# Patient Record
Sex: MALE | URBAN - METROPOLITAN AREA
[De-identification: names, ages, dates, MRNs, and addresses within clinical notes are randomized per-mention and may not be internally consistent; named-entity substitution may affect disease eponyms.]

---

## 2021-01-18 ENCOUNTER — APPOINTMENT (RX ONLY)
Dept: URBAN - METROPOLITAN AREA CLINIC 108 | Facility: CLINIC | Age: 31
Setting detail: DERMATOLOGY
End: 2021-01-18

## 2021-01-18 DIAGNOSIS — L738 OTHER SPECIFIED DISEASES OF HAIR AND HAIR FOLLICLES: ICD-10-CM

## 2021-01-18 DIAGNOSIS — L73.9 FOLLICULAR DISORDER, UNSPECIFIED: ICD-10-CM

## 2021-01-18 DIAGNOSIS — L663 OTHER SPECIFIED DISEASES OF HAIR AND HAIR FOLLICLES: ICD-10-CM

## 2021-01-18 PROBLEM — L02.12 FURUNCLE OF NECK: Status: ACTIVE | Noted: 2021-01-18

## 2021-01-18 PROBLEM — L02.821 FURUNCLE OF HEAD [ANY PART, EXCEPT FACE]: Status: ACTIVE | Noted: 2021-01-18

## 2021-01-18 PROCEDURE — ? PRESCRIPTION

## 2021-01-18 PROCEDURE — 99203 OFFICE O/P NEW LOW 30 MIN: CPT

## 2021-01-18 PROCEDURE — ? COUNSELING

## 2021-01-18 RX ORDER — CLINDAMYCIN PHOSPHATE 10 MG/ML
SOLUTION TOPICAL
Qty: 1 | Refills: 3 | Status: ERX | COMMUNITY
Start: 2021-01-18

## 2021-01-18 RX ORDER — DOXYCYCLINE 100 MG/1
CAPSULE ORAL
Qty: 60 | Refills: 3 | Status: ERX | COMMUNITY
Start: 2021-01-18

## 2021-01-18 RX ADMIN — DOXYCYCLINE: 100 CAPSULE ORAL at 00:00

## 2021-01-18 RX ADMIN — CLINDAMYCIN PHOSPHATE: 10 SOLUTION TOPICAL at 00:00

## 2021-01-18 ASSESSMENT — LOCATION DETAILED DESCRIPTION DERM
LOCATION DETAILED: POSTERIOR MID-PARIETAL SCALP
LOCATION DETAILED: MID POSTERIOR NECK

## 2021-01-18 ASSESSMENT — LOCATION SIMPLE DESCRIPTION DERM
LOCATION SIMPLE: POSTERIOR NECK
LOCATION SIMPLE: POSTERIOR SCALP

## 2021-01-18 ASSESSMENT — LOCATION ZONE DERM
LOCATION ZONE: NECK
LOCATION ZONE: SCALP

## 2021-01-18 NOTE — PROCEDURE: MIPS QUALITY
Detail Level: Detailed
Quality 226: Preventive Care And Screening: Tobacco Use: Screening And Cessation Intervention: Patient screened for tobacco use, is a smoker AND did not received Cessation Counseling for Medical Reasons
Quality 110: Preventive Care And Screening: Influenza Immunization: Influenza Immunization Ordered or Recommended, but not Administered due to system reason

## 2021-01-18 NOTE — HPI: INFECTION (FOLLICULITIS)
How Severe Is It?: moderate
Is This A New Presentation, Or A Follow-Up?: Folliculitis
Additional History: Patient states if he shaves his hair it helps keep it under control.

## 2021-03-01 ENCOUNTER — APPOINTMENT (RX ONLY)
Dept: URBAN - METROPOLITAN AREA CLINIC 108 | Facility: CLINIC | Age: 31
Setting detail: DERMATOLOGY
End: 2021-03-01

## 2021-03-01 DIAGNOSIS — L663 OTHER SPECIFIED DISEASES OF HAIR AND HAIR FOLLICLES: ICD-10-CM | Status: IMPROVED

## 2021-03-01 DIAGNOSIS — L73.9 FOLLICULAR DISORDER, UNSPECIFIED: ICD-10-CM | Status: IMPROVED

## 2021-03-01 DIAGNOSIS — L738 OTHER SPECIFIED DISEASES OF HAIR AND HAIR FOLLICLES: ICD-10-CM | Status: IMPROVED

## 2021-03-01 PROBLEM — L02.821 FURUNCLE OF HEAD [ANY PART, EXCEPT FACE]: Status: ACTIVE | Noted: 2021-03-01

## 2021-03-01 PROBLEM — L02.12 FURUNCLE OF NECK: Status: ACTIVE | Noted: 2021-03-01

## 2021-03-01 PROCEDURE — 99213 OFFICE O/P EST LOW 20 MIN: CPT

## 2021-03-01 PROCEDURE — ? COUNSELING

## 2021-03-01 PROCEDURE — ? TREATMENT REGIMEN

## 2021-03-01 ASSESSMENT — LOCATION SIMPLE DESCRIPTION DERM
LOCATION SIMPLE: POSTERIOR SCALP
LOCATION SIMPLE: POSTERIOR NECK

## 2021-03-01 ASSESSMENT — LOCATION ZONE DERM
LOCATION ZONE: SCALP
LOCATION ZONE: NECK

## 2021-03-01 ASSESSMENT — LOCATION DETAILED DESCRIPTION DERM
LOCATION DETAILED: LEFT POSTERIOR NECK
LOCATION DETAILED: POSTERIOR MID-PARIETAL SCALP

## 2021-03-01 NOTE — PROCEDURE: MIPS QUALITY
Detail Level: Detailed
Quality 110: Preventive Care And Screening: Influenza Immunization: Influenza Immunization Ordered or Recommended, but not Administered due to system reason
Quality 226: Preventive Care And Screening: Tobacco Use: Screening And Cessation Intervention: Patient screened for tobacco use, is a smoker AND did not received Cessation Counseling for Medical Reasons

## 2021-05-05 ENCOUNTER — APPOINTMENT (RX ONLY)
Dept: URBAN - METROPOLITAN AREA CLINIC 69 | Facility: CLINIC | Age: 31
Setting detail: DERMATOLOGY
End: 2021-05-05

## 2021-05-05 DIAGNOSIS — L738 OTHER SPECIFIED DISEASES OF HAIR AND HAIR FOLLICLES: ICD-10-CM

## 2021-05-05 DIAGNOSIS — L663 OTHER SPECIFIED DISEASES OF HAIR AND HAIR FOLLICLES: ICD-10-CM

## 2021-05-05 DIAGNOSIS — L73.9 FOLLICULAR DISORDER, UNSPECIFIED: ICD-10-CM

## 2021-05-05 PROBLEM — L02.821 FURUNCLE OF HEAD [ANY PART, EXCEPT FACE]: Status: ACTIVE | Noted: 2021-05-05

## 2021-05-05 PROCEDURE — ? ORDER TESTS

## 2021-05-05 PROCEDURE — ? COUNSELING

## 2021-05-05 PROCEDURE — 99213 OFFICE O/P EST LOW 20 MIN: CPT

## 2021-05-05 PROCEDURE — ? ADDITIONAL NOTES

## 2021-05-05 PROCEDURE — ? PRESCRIPTION MEDICATION MANAGEMENT

## 2021-05-05 PROCEDURE — ? TREATMENT REGIMEN

## 2021-05-05 ASSESSMENT — LOCATION DETAILED DESCRIPTION DERM: LOCATION DETAILED: POSTERIOR MID-PARIETAL SCALP

## 2021-05-05 ASSESSMENT — LOCATION ZONE DERM: LOCATION ZONE: SCALP

## 2021-05-05 ASSESSMENT — LOCATION SIMPLE DESCRIPTION DERM: LOCATION SIMPLE: POSTERIOR SCALP

## 2021-05-05 NOTE — PROCEDURE: PRESCRIPTION MEDICATION MANAGEMENT
Detail Level: Zone
Continue Regimen: - Doxycycline 100mg, 1 PO BID\\n- Clindamycin solution, apply BID
Render In Strict Bullet Format?: No

## 2021-05-05 NOTE — PROCEDURE: ORDER TESTS
Expected Date Of Service: 05/05/2021
Billing Type: Third-Party Bill
Performing Laboratory: -[id number]
Bill For Surgical Tray: no
Lab Facility: [id number]

## 2021-05-05 NOTE — PROCEDURE: ADDITIONAL NOTES
Additional Notes: Culture to look for gram negative folliculitis.
Render Risk Assessment In Note?: no
Detail Level: Zone

## 2021-05-13 ENCOUNTER — RX ONLY (OUTPATIENT)
Age: 31
Setting detail: RX ONLY
End: 2021-05-13

## 2021-05-13 RX ORDER — CEPHALEXIN 500 MG/1
CAPSULE ORAL
Qty: 30 | Refills: 0 | Status: ERX | COMMUNITY
Start: 2021-05-13

## 2023-11-15 ENCOUNTER — OFFICE VISIT (OUTPATIENT)
Dept: MEDICAL GROUP | Facility: PHYSICIAN GROUP | Age: 33
End: 2023-11-15
Payer: COMMERCIAL

## 2023-11-15 VITALS
HEART RATE: 92 BPM | DIASTOLIC BLOOD PRESSURE: 78 MMHG | TEMPERATURE: 98.6 F | SYSTOLIC BLOOD PRESSURE: 122 MMHG | OXYGEN SATURATION: 97 % | WEIGHT: 295 LBS | BODY MASS INDEX: 39.1 KG/M2 | HEIGHT: 73 IN

## 2023-11-15 DIAGNOSIS — R09.81 CHRONIC NASAL CONGESTION: ICD-10-CM

## 2023-11-15 DIAGNOSIS — R68.82 DECREASED SEX DRIVE: ICD-10-CM

## 2023-11-15 DIAGNOSIS — Z23 NEED FOR VACCINATION: ICD-10-CM

## 2023-11-15 DIAGNOSIS — E66.9 CLASS 2 OBESITY WITHOUT SERIOUS COMORBIDITY WITH BODY MASS INDEX (BMI) OF 38.0 TO 38.9 IN ADULT, UNSPECIFIED OBESITY TYPE: ICD-10-CM

## 2023-11-15 DIAGNOSIS — Z76.89 ENCOUNTER TO ESTABLISH CARE: ICD-10-CM

## 2023-11-15 DIAGNOSIS — M54.12 CERVICAL RADICULOPATHY: ICD-10-CM

## 2023-11-15 DIAGNOSIS — F90.9 ATTENTION DEFICIT HYPERACTIVITY DISORDER (ADHD), UNSPECIFIED ADHD TYPE: ICD-10-CM

## 2023-11-15 DIAGNOSIS — L73.9 FOLLICULITIS: ICD-10-CM

## 2023-11-15 DIAGNOSIS — F41.1 GAD (GENERALIZED ANXIETY DISORDER): ICD-10-CM

## 2023-11-15 PROCEDURE — 3078F DIAST BP <80 MM HG: CPT | Performed by: STUDENT IN AN ORGANIZED HEALTH CARE EDUCATION/TRAINING PROGRAM

## 2023-11-15 PROCEDURE — 99204 OFFICE O/P NEW MOD 45 MIN: CPT | Mod: 25 | Performed by: STUDENT IN AN ORGANIZED HEALTH CARE EDUCATION/TRAINING PROGRAM

## 2023-11-15 PROCEDURE — 90746 HEPB VACCINE 3 DOSE ADULT IM: CPT | Performed by: STUDENT IN AN ORGANIZED HEALTH CARE EDUCATION/TRAINING PROGRAM

## 2023-11-15 PROCEDURE — 90715 TDAP VACCINE 7 YRS/> IM: CPT | Performed by: STUDENT IN AN ORGANIZED HEALTH CARE EDUCATION/TRAINING PROGRAM

## 2023-11-15 PROCEDURE — 3074F SYST BP LT 130 MM HG: CPT | Performed by: STUDENT IN AN ORGANIZED HEALTH CARE EDUCATION/TRAINING PROGRAM

## 2023-11-15 PROCEDURE — 90472 IMMUNIZATION ADMIN EACH ADD: CPT | Performed by: STUDENT IN AN ORGANIZED HEALTH CARE EDUCATION/TRAINING PROGRAM

## 2023-11-15 PROCEDURE — 90471 IMMUNIZATION ADMIN: CPT | Performed by: STUDENT IN AN ORGANIZED HEALTH CARE EDUCATION/TRAINING PROGRAM

## 2023-11-15 RX ORDER — CLINDAMYCIN PHOSPHATE 11.9 MG/ML
SOLUTION TOPICAL
COMMUNITY
Start: 2023-10-25

## 2023-11-15 RX ORDER — KETOCONAZOLE 20 MG/ML
SHAMPOO TOPICAL
COMMUNITY
Start: 2023-10-25

## 2023-11-15 RX ORDER — BUSPIRONE HYDROCHLORIDE 10 MG/1
10 TABLET ORAL 2 TIMES DAILY
Qty: 60 TABLET | Refills: 1 | Status: SHIPPED | OUTPATIENT
Start: 2023-11-15 | End: 2023-12-07

## 2023-11-15 RX ORDER — CLINDAMYCIN PHOSPHATE AND BENZOYL PEROXIDE 10; 50 MG/G; MG/G
GEL TOPICAL
COMMUNITY
Start: 2023-10-25 | End: 2023-11-15

## 2023-11-15 RX ORDER — MINOCYCLINE HYDROCHLORIDE 100 MG/1
CAPSULE ORAL
COMMUNITY
Start: 2023-10-25

## 2023-11-15 ASSESSMENT — ANXIETY QUESTIONNAIRES
3. WORRYING TOO MUCH ABOUT DIFFERENT THINGS: NOT AT ALL
2. NOT BEING ABLE TO STOP OR CONTROL WORRYING: SEVERAL DAYS
GAD7 TOTAL SCORE: 7
1. FEELING NERVOUS, ANXIOUS, OR ON EDGE: SEVERAL DAYS
6. BECOMING EASILY ANNOYED OR IRRITABLE: NEARLY EVERY DAY
5. BEING SO RESTLESS THAT IT IS HARD TO SIT STILL: SEVERAL DAYS
7. FEELING AFRAID AS IF SOMETHING AWFUL MIGHT HAPPEN: NOT AT ALL
4. TROUBLE RELAXING: SEVERAL DAYS

## 2023-11-15 ASSESSMENT — PATIENT HEALTH QUESTIONNAIRE - PHQ9: CLINICAL INTERPRETATION OF PHQ2 SCORE: 0

## 2023-11-15 NOTE — LETTER
[facility]  Heavenly Pittman M.D.  [address]  [place] [address]  Fax: [phone]   Authorization for Release/Disclosure of   Protected Health Information   Name: PIERCE DELGADO : 1990 SSN: [id number]   Address: [address] [place] [address]  [address]  [place] [address] Phone:    [phone] (home)    I authorize the entity listed below to release/disclose the PHI below to:   [facility]/Heavenly Pittman M.D. and Heavenly Pittman M.D.   Provider or Entity Name:  [facility]  [address], [place] [address]  Phone:      Fax:     Reason for request: continuity of care   Information to be released:    [  ] LAST COLONOSCOPY,  including any PATH REPORT and follow-up  [  ] LAST FIT/COLOGUARD RESULT [  ] LAST DEXA  [  ] LAST MAMMOGRAM  [  ] LAST PAP  [  ] LAST LABS [  ] RETINA EXAM REPORT  [  ] IMMUNIZATION RECORDS  [ X ] Release all info      [  ] Check here and initial the line next to each item to release ALL health information INCLUDING  _____ Care and treatment for drug and / or alcohol abuse  _____ HIV testing, infection status, or AIDS  _____ Genetic Testing    DATES OF SERVICE OR TIME PERIOD TO BE DISCLOSED: _____________  I understand and acknowledge that:  * This Authorization may be revoked at any time by you in writing, except if your health information has already been used or disclosed.  * Your health information that will be used or disclosed as a result of you signing this authorization could be re-disclosed by the recipient. If this occurs, your re-disclosed health information may no longer be protected by State or Federal laws.  * You may refuse to sign this Authorization. Your refusal will not affect your ability to obtain treatment.  * This Authorization becomes effective upon signing and will  on (date) __________.      If no date is indicated, this Authorization will  one (1) year from the signature date.    Name: Pierce Delgado  Signature: Date:    11/15/2023     PLEASE FAX REQUESTED RECORDS BACK TO: [phone]

## 2023-11-15 NOTE — PROGRESS NOTES
Subjective:     CC:  establish care    HISTORY OF THE PRESENT ILLNESS: Patient is a 33 y.o. male here today to establish care. Prior PCP was in [place], [place].    DANIELLE (generalized anxiety disorder)  Patient complains of worsening anxiety due to work and possibly moving back to [place].  Patient denies panic attacks but reports 2 episodes over the past 1 month of sudden onset shortness of breath and tachycardia that resolved in 1 minute.  Patient is agreeable to try medication and therapy.  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        11/15/2023     4:22 PM    DANIELLE-7 ANXIETY SCALE FLOWSHEET   Feeling nervous, anxious, or on edge 1   Not being able to stop or control worrying 1   Worrying too much about different things 0   Trouble relaxing 1   Being so restless that it is hard to sit still 1   Becoming easily annoyed or irritable 3   Feeling afraid as if something awful might happen 0   DANIELLE-7 Total Score 7     Interpretation of DANIELLE-7 Total Score   Score Severity   0-4 Minimal Anxiety  5-9 Mild Anxiety   10-14 Moderate Anxiety  15-21 Severy Anxiety        11/15/2023     4:40 PM   PHQ-9 Screening   Little interest or pleasure in doing things 0 - not at all   Feeling down, depressed, or hopeless 0 - not at all   PHQ-2 Total Score 0     Interpretation of PHQ-9 Total Score   Score Severity   1-4 No Depression   5-9 Mild Depression   10-14 Moderate Depression   15-19 Moderately Severe Depression   20-27 Severe Depression    Folliculitis  Follows up with dermatology (records requested).  Currently using clindamycin solution on scalp daily, ketoconazole shampoo daily, minocycline 100 mg twice daily.    Chronic nasal congestion  Patient complains of chronic nasal congestion for the past 4-5 years and is requesting referral to ENT.  Denies history of asthma.  Patient reports no improvement with saline nasal spray, Flonase, Allegra.  Patient reports symptoms have been getting worse since moving here from  [place] due to [place]brush.  Patient also uses humidifier.    Cervical radiculopathy  Patient is concerned about a pinched nerve in his neck.  Patient reports with flexion to the left he has pain that radiates into his left shoulder.  Patient declined physical therapy and will follow-up with chiropractor.        Health Maintenance: Completed    Not on File  Patient Active Problem List   Diagnosis    Folliculitis    DANIELLE (generalized anxiety disorder)    Attention deficit hyperactivity disorder (ADHD)    Decreased sex drive    Chronic nasal congestion    Cervical radiculopathy     Current Outpatient Medications   Medication Sig Dispense Refill    minocycline (MINOCIN) 100 MG Cap TAKE 1 CAPSULE BY MOUTH TWICE A DAY WITH FOOD      ketoconazole (NIZORAL) 2 % shampoo WASH SCALP, CHEST, BACK DAILY WHILE SHOWERING, ALLOW TO SIT 2-3 MINUTES PRIOR TO RINSING      clindamycin (CLEOCIN) 1 % Solution APPLY A THIN LAYER TO THE SCALP , CHEST AND BACK 1-2 TIMES DAILY      busPIRone (BUSPAR) 10 MG Tab tablet Take 1 Tablet by mouth 2 times a day. 60 Tablet 1     No current facility-administered medications for this visit.     History reviewed. No pertinent surgical history.   Social History     Socioeconomic History    Marital status:      Spouse name: Not on file    Number of children: 1    Years of education: Not on file    Highest education level: Not on file   Occupational History     Employer: [employer]   Tobacco Use    Smoking status: Never    Smokeless tobacco: Never   Vaping Use    Vaping Use: Every day    Substances: Nicotine   Substance and Sexual Activity    Alcohol use: Yes     Alcohol/week: 7.2 oz     Types: 12 Cans of beer per week    Drug use: Yes     Types: Marijuana    Sexual activity: Yes     Partners: Female     Birth control/protection: Injection   Other Topics Concern    Not on file   Social History Narrative    Lives with wife.  1 child.  Moved from [place] for work ([employer]).     Social  "Determinants of Health     Financial Resource Strain: Not on file   Food Insecurity: Not on file   Transportation Needs: Not on file   Physical Activity: Not on file   Stress: Not on file   Social Connections: Not on file   Intimate Partner Violence: Not on file   Housing Stability: Not on file     Family History   Problem Relation Age of Onset    Cancer Mother         jaw?    Diabetes Mother     Diabetes Father     Cancer Paternal Uncle         leukemia    Cancer Maternal Grandfather     Ovarian Cancer Neg Hx     Tubal Cancer Neg Hx     Peritoneal Cancer Neg Hx     Colorectal Cancer Neg Hx     Breast Cancer Neg Hx     Heart Disease Neg Hx     Stroke Neg Hx          ROS:     Constitutional:  Negative for chills, fever, fatigue, weight loss.  HEENT:  Negative for blurred vision, hearing loss, sore throat.    Respiratory:  Negative for cough, sputum production and shortness of breath.  Cardiovascular:  Negative for chest pain, palpitations and leg swelling.  Gastrointestinal:  Negative for abdominal pain, blood in stool, constipation, diarrhea and vomiting.   Musculoskeletal:  Negative for back pain, falls, joint pain.   Skin:  Negative for rash.   Neurological:  Negative for dizziness, seizures, weakness and headaches.   Endo/Heme/Allergies:  Does not bruise/bleed easily.   Psychiatric/Behavioral:  Positive for anxiety but negative for depression and suicidal thoughts.      Objective:     Exam: /78   Pulse 92   Temp 37 °C (98.6 °F) (Temporal)   Ht 1.854 m (6' 1\")   Wt (!) 134 kg (295 lb)   SpO2 97%  Body mass index is 38.92 kg/m².    Gen: Alert and oriented, no acute distress.  Eyes:  PERRL, conjunctivae clear, lids normal.   ENMT: Lips without lesions, good dentition, moist mucous membranes.  Neck: Neck is supple, trachea middle, no thyromegaly.  Lungs: Normal effort, CTAB, no wheezing / rhonchi / rales.  CV: RRR, normal S1 and S2, no murmurs.  GI:  Abdomen soft, non-tender, non-distended with normal " bowel sounds.  MSK:  Normal ROM.  Ext: No clubbing, cyanosis, or edema.  Skin:  Warm and dry with no rashes or lesions.  Neuro: AAO x 3, no acute focal deficits.  Psych: Normal affect and mood.      Assessment & Plan:   33 y.o. male with the following -    1. Encounter to establish care  Patient presents today to establish care.  History was discussed in detail with the patient.  Annual labs ordered.  Patient reports screening for HIV and Hep C was negative in the past.  Patient declined influenza vaccine.    2. Folliculitis  Chronic, controlled.  Follows up with dermatology (records requested).  Continue clindamycin solution on scalp daily, ketoconazole shampoo daily, minocycline 100 mg twice daily.    3. Chronic nasal congestion  Chronic, uncontrolled.  No improvement with humidifier, saline nasal spray, Flonase, Allegra.  Patient requested referral to ENT.  - Referral to ENT    4. Decreased sex drive  Chronic.  Possibly due to mental health but labs ordered to rule out other causes.  - Testosterone, Free & Total, Adult Male (w/SHBG); Future    5. Attention deficit hyperactivity disorder (ADHD), unspecified ADHD type  Chronic, uncontrolled.  History of ADHD that was treated in the past.  Given referral to psychiatry for further management.  - Referral to Behavioral Health  - TSH WITH REFLEX TO FT4; Future    6. DANIELLE (generalized anxiety disorder)  Chronic, uncontrolled.  DANIELLE-7 score 7 (mild anxiety) today.  PHQ-2 score 0.  Given referral to behavioral health for psychiatry and therapy.  Prescribed buspirone 10 mg twice daily.  Side effects of medication were discussed.  - busPIRone (BUSPAR) 10 MG Tab tablet; Take 1 Tablet by mouth 2 times a day.  Dispense: 60 Tablet; Refill: 1  - Referral to Behavioral Health  - TSH WITH REFLEX TO FT4; Future    7. Class 2 obesity without serious comorbidity with body mass index (BMI) of 38.0 to 38.9 in adult, unspecified obesity type  Chronic, uncontrolled.  BMI 38.92 today.   Screening for diabetes, cholesterol, thyroid ordered.  - CBC WITHOUT DIFFERENTIAL; Future  - Comp Metabolic Panel; Future  - Lipid Profile; Future  - TSH WITH REFLEX TO FT4; Future    8. Cervical radiculopathy  Chronic.  Patient declined physical therapy and plans to see chiropractor.    9. Need for vaccination  - Tdap =>6yo IM  - Hepatitis B Vaccine Adult IM        Return in about 1 month (around 12/15/2023) for Discuss labs, Discuss medication, Hep B#2.    Please note that this dictation was created using voice recognition software. I have made every reasonable attempt to correct obvious errors, but I expect that there are errors of grammar and possibly content that I did not discover before finalizing the note.

## 2023-11-16 ENCOUNTER — APPOINTMENT (OUTPATIENT)
Dept: MEDICAL GROUP | Facility: PHYSICIAN GROUP | Age: 33
End: 2023-11-16
Payer: COMMERCIAL

## 2023-11-16 ENCOUNTER — HOSPITAL ENCOUNTER (OUTPATIENT)
Dept: LAB | Facility: MEDICAL CENTER | Age: 33
End: 2023-11-16
Attending: STUDENT IN AN ORGANIZED HEALTH CARE EDUCATION/TRAINING PROGRAM
Payer: COMMERCIAL

## 2023-11-16 DIAGNOSIS — R68.82 DECREASED SEX DRIVE: ICD-10-CM

## 2023-11-16 DIAGNOSIS — F90.9 ATTENTION DEFICIT HYPERACTIVITY DISORDER (ADHD), UNSPECIFIED ADHD TYPE: ICD-10-CM

## 2023-11-16 DIAGNOSIS — E66.9 CLASS 2 OBESITY WITHOUT SERIOUS COMORBIDITY WITH BODY MASS INDEX (BMI) OF 38.0 TO 38.9 IN ADULT, UNSPECIFIED OBESITY TYPE: ICD-10-CM

## 2023-11-16 DIAGNOSIS — F41.1 GAD (GENERALIZED ANXIETY DISORDER): ICD-10-CM

## 2023-11-16 LAB
ALBUMIN SERPL BCP-MCNC: 4.3 G/DL (ref 3.2–4.9)
ALBUMIN/GLOB SERPL: 1.3 G/DL
ALP SERPL-CCNC: 42 U/L (ref 30–99)
ALT SERPL-CCNC: 52 U/L (ref 2–50)
ANION GAP SERPL CALC-SCNC: 10 MMOL/L (ref 7–16)
AST SERPL-CCNC: 35 U/L (ref 12–45)
BILIRUB SERPL-MCNC: 0.4 MG/DL (ref 0.1–1.5)
BUN SERPL-MCNC: 13 MG/DL (ref 8–22)
CALCIUM ALBUM COR SERPL-MCNC: 9.2 MG/DL (ref 8.5–10.5)
CALCIUM SERPL-MCNC: 9.4 MG/DL (ref 8.5–10.5)
CHLORIDE SERPL-SCNC: 104 MMOL/L (ref 96–112)
CHOLEST SERPL-MCNC: 244 MG/DL (ref 100–199)
CO2 SERPL-SCNC: 24 MMOL/L (ref 20–33)
CREAT SERPL-MCNC: 0.61 MG/DL (ref 0.5–1.4)
ERYTHROCYTE [DISTWIDTH] IN BLOOD BY AUTOMATED COUNT: 42.1 FL (ref 35.9–50)
FASTING STATUS PATIENT QL REPORTED: NORMAL
GFR SERPLBLD CREATININE-BSD FMLA CKD-EPI: 131 ML/MIN/1.73 M 2
GLOBULIN SER CALC-MCNC: 3.2 G/DL (ref 1.9–3.5)
GLUCOSE SERPL-MCNC: 86 MG/DL (ref 65–99)
HCT VFR BLD AUTO: 50 % (ref 42–52)
HDLC SERPL-MCNC: 38 MG/DL
HGB BLD-MCNC: 16.7 G/DL (ref 14–18)
LDLC SERPL CALC-MCNC: 148 MG/DL
MCH RBC QN AUTO: 29.5 PG (ref 27–33)
MCHC RBC AUTO-ENTMCNC: 33.4 G/DL (ref 32.3–36.5)
MCV RBC AUTO: 88.2 FL (ref 81.4–97.8)
PLATELET # BLD AUTO: 292 K/UL (ref 164–446)
PMV BLD AUTO: 9.9 FL (ref 9–12.9)
POTASSIUM SERPL-SCNC: 4.6 MMOL/L (ref 3.6–5.5)
PROT SERPL-MCNC: 7.5 G/DL (ref 6–8.2)
RBC # BLD AUTO: 5.67 M/UL (ref 4.7–6.1)
SODIUM SERPL-SCNC: 138 MMOL/L (ref 135–145)
TRIGL SERPL-MCNC: 291 MG/DL (ref 0–149)
TSH SERPL DL<=0.005 MIU/L-ACNC: 2.19 UIU/ML (ref 0.38–5.33)
WBC # BLD AUTO: 9 K/UL (ref 4.8–10.8)

## 2023-11-16 PROCEDURE — 80061 LIPID PANEL: CPT

## 2023-11-16 PROCEDURE — 80053 COMPREHEN METABOLIC PANEL: CPT

## 2023-11-16 PROCEDURE — 84402 ASSAY OF FREE TESTOSTERONE: CPT

## 2023-11-16 PROCEDURE — 85027 COMPLETE CBC AUTOMATED: CPT

## 2023-11-16 PROCEDURE — 84403 ASSAY OF TOTAL TESTOSTERONE: CPT

## 2023-11-16 PROCEDURE — 36415 COLL VENOUS BLD VENIPUNCTURE: CPT

## 2023-11-16 PROCEDURE — 84443 ASSAY THYROID STIM HORMONE: CPT

## 2023-11-16 PROCEDURE — 84270 ASSAY OF SEX HORMONE GLOBUL: CPT

## 2023-11-16 NOTE — ASSESSMENT & PLAN NOTE
Patient complains of chronic nasal congestion for the past 4-5 years and is requesting referral to ENT.  Denies history of asthma.  Patient reports no improvement with saline nasal spray, Flonase, Allegra.  Patient reports symptoms have been getting worse since moving here from [place] due to [place].  Patient also uses humidifier.

## 2023-11-16 NOTE — ASSESSMENT & PLAN NOTE
Patient complains of worsening anxiety due to work and possibly moving back to [place].  Patient denies panic attacks but reports 2 episodes over the past 1 month of sudden onset shortness of breath and tachycardia that resolved in 1 minute.  Patient is agreeable to try medication and therapy.  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        11/15/2023     4:22 PM    DANIELLE-7 ANXIETY SCALE FLOWSHEET   Feeling nervous, anxious, or on edge 1   Not being able to stop or control worrying 1   Worrying too much about different things 0   Trouble relaxing 1   Being so restless that it is hard to sit still 1   Becoming easily annoyed or irritable 3   Feeling afraid as if something awful might happen 0   DANIELLE-7 Total Score 7     Interpretation of DANIELLE-7 Total Score   Score Severity   0-4 Minimal Anxiety  5-9 Mild Anxiety   10-14 Moderate Anxiety  15-21 Severy Anxiety        11/15/2023     4:40 PM   PHQ-9 Screening   Little interest or pleasure in doing things 0 - not at all   Feeling down, depressed, or hopeless 0 - not at all   PHQ-2 Total Score 0     Interpretation of PHQ-9 Total Score   Score Severity   1-4 No Depression   5-9 Mild Depression   10-14 Moderate Depression   15-19 Moderately Severe Depression   20-27 Severe Depression

## 2023-11-16 NOTE — ASSESSMENT & PLAN NOTE
Follows up with dermatology (records requested).  Currently using clindamycin solution on scalp daily, ketoconazole shampoo daily, minocycline 100 mg twice daily.

## 2023-11-16 NOTE — ASSESSMENT & PLAN NOTE
Patient is concerned about a pinched nerve in his neck.  Patient reports with flexion to the left he has pain that radiates into his left shoulder.  Patient declined physical therapy and will follow-up with chiropractor.

## 2023-11-19 LAB
SHBG SERPL-SCNC: 32 NMOL/L (ref 17–56)
TESTOST FREE MFR SERPL: 1.8 % (ref 1.6–2.9)
TESTOST FREE SERPL-MCNC: 62 PG/ML (ref 47–244)
TESTOST SERPL-MCNC: 336 NG/DL (ref 300–1080)

## 2023-12-07 ENCOUNTER — OFFICE VISIT (OUTPATIENT)
Dept: MEDICAL GROUP | Facility: PHYSICIAN GROUP | Age: 33
End: 2023-12-07
Payer: COMMERCIAL

## 2023-12-07 VITALS
TEMPERATURE: 97.2 F | SYSTOLIC BLOOD PRESSURE: 100 MMHG | RESPIRATION RATE: 16 BRPM | WEIGHT: 301 LBS | BODY MASS INDEX: 40.77 KG/M2 | HEART RATE: 85 BPM | OXYGEN SATURATION: 95 % | HEIGHT: 72 IN | DIASTOLIC BLOOD PRESSURE: 70 MMHG

## 2023-12-07 DIAGNOSIS — R68.82 DECREASED SEX DRIVE: ICD-10-CM

## 2023-12-07 DIAGNOSIS — N52.1 ERECTILE DYSFUNCTION DUE TO DISEASES CLASSIFIED ELSEWHERE: ICD-10-CM

## 2023-12-07 DIAGNOSIS — F41.1 GAD (GENERALIZED ANXIETY DISORDER): ICD-10-CM

## 2023-12-07 DIAGNOSIS — E78.5 DYSLIPIDEMIA: ICD-10-CM

## 2023-12-07 PROCEDURE — 3078F DIAST BP <80 MM HG: CPT | Performed by: STUDENT IN AN ORGANIZED HEALTH CARE EDUCATION/TRAINING PROGRAM

## 2023-12-07 PROCEDURE — 3074F SYST BP LT 130 MM HG: CPT | Performed by: STUDENT IN AN ORGANIZED HEALTH CARE EDUCATION/TRAINING PROGRAM

## 2023-12-07 PROCEDURE — 99214 OFFICE O/P EST MOD 30 MIN: CPT | Performed by: STUDENT IN AN ORGANIZED HEALTH CARE EDUCATION/TRAINING PROGRAM

## 2023-12-07 RX ORDER — BUSPIRONE HYDROCHLORIDE 15 MG/1
15 TABLET ORAL 2 TIMES DAILY
Qty: 60 TABLET | Refills: 1 | Status: SHIPPED | OUTPATIENT
Start: 2023-12-07

## 2023-12-07 RX ORDER — SILDENAFIL 50 MG/1
50 TABLET, FILM COATED ORAL
Qty: 10 TABLET | Refills: 2 | Status: SHIPPED | OUTPATIENT
Start: 2023-12-07

## 2023-12-07 ASSESSMENT — FIBROSIS 4 INDEX: FIB4 SCORE: 0.52

## 2023-12-07 NOTE — ASSESSMENT & PLAN NOTE
DANIELLE-7 score 7 (mild anxiety) at the last visit and patient was started on buspirone 10 mg twice daily.  Patient has not noticed a difference on the medication and would like to try increasing the dose.  Given contact information for behavioral health.

## 2023-12-07 NOTE — PROGRESS NOTES
Subjective:     Chief Complaint   Patient presents with    Medication Management     Anxiety fv         HPI:   Abner presents today with    Chronic nasal congestion  Given contact information for ENT today.    Attention deficit hyperactivity disorder (ADHD)  Given contact information for behavioral health today.    DANIELLE (generalized anxiety disorder)  DANIELLE-7 score 7 (mild anxiety) at the last visit and patient was started on buspirone 10 mg twice daily.  Patient has not noticed a difference on the medication and would like to try increasing the dose.  Given contact information for behavioral health.    Decreased sex drive  We discussed possible causes today including mental health, alcohol use, vaping, and marijuana use.  TSH, fasting glucose, testosterone WNL.  /70 today.    Dyslipidemia  November 2023 , , HDL 38,         Health Maintenance: Completed    ROS:  Negative except as stated above.      Objective:     Exam:  /70 (BP Location: Left arm, Patient Position: Sitting, BP Cuff Size: Large adult)   Pulse 85   Temp 36.2 °C (97.2 °F) (Temporal)   Resp 16   Ht 1.829 m (6')   Wt (!) 137 kg (301 lb)   SpO2 95%   BMI 40.82 kg/m²  Body mass index is 40.82 kg/m².    Physical Exam    Gen: Alert and oriented, no acute distress.  Lungs: Normal effort, CTAB, no wheezing / rhonchi / rales.  CV: RRR, normal S1 and S2, no murmurs.      Labs:   Hospital Outpatient Visit on 11/16/2023   Component Date Value Ref Range Status    TSH 11/16/2023 2.190  0.380 - 5.330 uIU/mL Final    Comment: The 2011 American Thyroid Association (HERNAN) guidelines  recommended that the interpretation of thyroid function in  pregnancy be based on trimester specific reference ranges.    1st Trimester  0.100-2.500 mIU/L  2nd Trimester  0.200-3.000 mIU/L  3rd Trimester  0.300-3.500 mIU/L    These established reference ranges have not been validated  at [facility].      Testosterone,Total 11/16/2023 336   300 - 1080 ng/dL Final    Comment: INTERPRETIVE INFORMATION: Testosterone by Immunoassay  Testosterone immunoassays are both imprecise and inaccurate at low  testosterone concentrations, such as those found in children and  cisgender females. For these individuals, testing by mass  spectrometry is recommended; refer to Testosterone (Adult Females,  Children, or Individuals on Testosterone-Suppressing Hormone  Therapy) ([employer] test code 6829971). Free or bioavailable  testosterone measurements may provide supportive information.  For individuals on testosterone hormone therapy, refer to  cisgender male reference intervals. No reference intervals have  been established for males younger than 14 years or for cisgender  females. For a complete set of all established reference  intervals, refer to [facility]/Tests/Pub/[id number].      Sex Hormone Bind Globulin 11/16/2023 32  17 - 56 nmol/L Final    Comment: REFERENCE INTERVAL: Sex Hormone Binding Globulin  Access complete set of age- and/or gender-specific reference  intervals for this test in the [facility] Test Directory  ([facility]).      Free Testosterone 11/16/2023 62  47 - 244 pg/mL Final    Comment: INTERPRETIVE INFORMATION:  Testosterone, Free Calculation  Free testosterone concentration is calculated using total  testosterone (measured by immunoassay) and the binding constant of  testosterone and sex hormone-binding globulin (SHBG). Testosterone  immunoassays are both imprecise and inaccurate at low testosterone  concentrations, such as those found in children and cisgender  females. For these individuals, testing by mass spectrometry is  recommended; refer to Testosterone, Free (Adult Females, Children,  or Individuals on Testosterone-Suppressing Hormone Therapy) ([facility]  test code [id number]).  For individuals on testosterone hormone therapy, refer to  cisgender male reference intervals. No reference intervals have  been established for males younger than 14  years or for cisgender  females. For a complete set of all established reference  intervals, refer to [facility]/Tests/Pub/[id number].      Testosterone % Free 11/16/2023 1.8  1.6 - 2.9 % Final    Comment: Performed By: [facility]  [address] [place] [id number]  : Savage Ward MD, PhD  CLIA Number: [id number]      Cholesterol,Tot 11/16/2023 244 (H)  100 - 199 mg/dL Final    Triglycerides 11/16/2023 291 (H)  0 - 149 mg/dL Final    HDL 11/16/2023 38 (A)  >=40 mg/dL Final    LDL 11/16/2023 148 (H)  <100 mg/dL Final    Sodium 11/16/2023 138  135 - 145 mmol/L Final    Potassium 11/16/2023 4.6  3.6 - 5.5 mmol/L Final    Chloride 11/16/2023 104  96 - 112 mmol/L Final    Co2 11/16/2023 24  20 - 33 mmol/L Final    Anion Gap 11/16/2023 10.0  7.0 - 16.0 Final    Glucose 11/16/2023 86  65 - 99 mg/dL Final    Bun 11/16/2023 13  8 - 22 mg/dL Final    Creatinine 11/16/2023 0.61  0.50 - 1.40 mg/dL Final    Calcium 11/16/2023 9.4  8.5 - 10.5 mg/dL Final    Correct Calcium 11/16/2023 9.2  8.5 - 10.5 mg/dL Final    AST(SGOT) 11/16/2023 35  12 - 45 U/L Final    ALT(SGPT) 11/16/2023 52 (H)  2 - 50 U/L Final    Alkaline Phosphatase 11/16/2023 42  30 - 99 U/L Final    Total Bilirubin 11/16/2023 0.4  0.1 - 1.5 mg/dL Final    Albumin 11/16/2023 4.3  3.2 - 4.9 g/dL Final    Total Protein 11/16/2023 7.5  6.0 - 8.2 g/dL Final    Globulin 11/16/2023 3.2  1.9 - 3.5 g/dL Final    A-G Ratio 11/16/2023 1.3  g/dL Final    WBC 11/16/2023 9.0  4.8 - 10.8 K/uL Final    RBC 11/16/2023 5.67  4.70 - 6.10 M/uL Final    Hemoglobin 11/16/2023 16.7  14.0 - 18.0 g/dL Final    Hematocrit 11/16/2023 50.0  42.0 - 52.0 % Final    MCV 11/16/2023 88.2  81.4 - 97.8 fL Final    MCH 11/16/2023 29.5  27.0 - 33.0 pg Final    MCHC 11/16/2023 33.4  32.3 - 36.5 g/dL Final    Please note new reference range effective 05/22/2023.    RDW 11/16/2023 42.1  35.9 - 50.0 fL Final    Platelet Count 11/16/2023 292  164 - 446 K/uL Final     MPV 11/16/2023 9.9  9.0 - 12.9 fL Final    Fasting Status 11/16/2023 Fasting   Final    GFR (CKD-EPI) 11/16/2023 131  >60 mL/min/1.73 m 2 Final    Comment: Estimated Glomerular Filtration Rate is calculated using  race neutral CKD-EPI 2021 equation per NKF-ASN recommendations.           Assessment & Plan:     33 y.o. male with the following -     1. DANIELLE (generalized anxiety disorder)  Chronic, uncontrolled.  Buspirone increased to 50 mg twice daily.  Given contact information for behavioral health to schedule appointment.  - busPIRone (BUSPAR) 15 MG tablet; Take 1 Tablet by mouth 2 times a day.  Dispense: 60 Tablet; Refill: 1    2. Erectile dysfunction due to diseases classified elsewhere  3. Decreased sex drive  Chronic, uncontrolled.  BP normal.  TSH, fasting glucose, testosterone WNL.  We discussed possible causes today.  Patient was given contact information for behavioral health and advised to schedule appointment.  Patient was also advised to limit alcohol, vaping, and marijuana use.  Prescribed Viagra 50 mg as needed.  Side effects of medication were discussed.  - sildenafil citrate (VIAGRA) 50 MG tablet; Take 1 Tablet by mouth 1 time a day as needed for Erectile Dysfunction.  Dispense: 10 Tablet; Refill: 2    4. Dyslipidemia  Chronic, uncontrolled.  , , HDL 38, .  Statin not indicated at his age.  Advised to limit saturated/trans fat in diet and cut down on alcohol use.  Patient was also advised to start daily fish oil.          Return in about 6 weeks (around 1/18/2024) for Discuss medication, Hep B #2.    Please note that this dictation was created using voice recognition software. I have made every reasonable attempt to correct obvious errors, but I expect that there are errors of grammar and possibly content that I did not discover before finalizing the note.

## 2023-12-07 NOTE — ASSESSMENT & PLAN NOTE
We discussed possible causes today including mental health, alcohol use, vaping, and marijuana use.  TSH, fasting glucose, testosterone WNL.  /70 today.

## 2024-01-17 ENCOUNTER — OFFICE VISIT (OUTPATIENT)
Dept: MEDICAL GROUP | Facility: PHYSICIAN GROUP | Age: 34
End: 2024-01-17
Payer: COMMERCIAL

## 2024-01-17 VITALS
BODY MASS INDEX: 40.29 KG/M2 | DIASTOLIC BLOOD PRESSURE: 74 MMHG | TEMPERATURE: 99.1 F | WEIGHT: 304 LBS | OXYGEN SATURATION: 96 % | HEART RATE: 84 BPM | HEIGHT: 73 IN | SYSTOLIC BLOOD PRESSURE: 124 MMHG

## 2024-01-17 DIAGNOSIS — E66.9 CLASS 2 OBESITY WITHOUT SERIOUS COMORBIDITY WITH BODY MASS INDEX (BMI) OF 38.0 TO 38.9 IN ADULT, UNSPECIFIED OBESITY TYPE: ICD-10-CM

## 2024-01-17 DIAGNOSIS — Z23 NEED FOR VACCINATION: ICD-10-CM

## 2024-01-17 DIAGNOSIS — F41.1 GAD (GENERALIZED ANXIETY DISORDER): ICD-10-CM

## 2024-01-17 PROCEDURE — 99214 OFFICE O/P EST MOD 30 MIN: CPT | Mod: 25 | Performed by: STUDENT IN AN ORGANIZED HEALTH CARE EDUCATION/TRAINING PROGRAM

## 2024-01-17 PROCEDURE — 3078F DIAST BP <80 MM HG: CPT | Performed by: STUDENT IN AN ORGANIZED HEALTH CARE EDUCATION/TRAINING PROGRAM

## 2024-01-17 PROCEDURE — 90471 IMMUNIZATION ADMIN: CPT | Performed by: STUDENT IN AN ORGANIZED HEALTH CARE EDUCATION/TRAINING PROGRAM

## 2024-01-17 PROCEDURE — 90746 HEPB VACCINE 3 DOSE ADULT IM: CPT | Performed by: STUDENT IN AN ORGANIZED HEALTH CARE EDUCATION/TRAINING PROGRAM

## 2024-01-17 PROCEDURE — 3074F SYST BP LT 130 MM HG: CPT | Performed by: STUDENT IN AN ORGANIZED HEALTH CARE EDUCATION/TRAINING PROGRAM

## 2024-01-17 RX ORDER — CHLORAL HYDRATE 500 MG
1000 CAPSULE ORAL 2 TIMES DAILY
COMMUNITY

## 2024-01-17 ASSESSMENT — PATIENT HEALTH QUESTIONNAIRE - PHQ9
SUM OF ALL RESPONSES TO PHQ QUESTIONS 1-9: 10
5. POOR APPETITE OR OVEREATING: 1 - SEVERAL DAYS
CLINICAL INTERPRETATION OF PHQ2 SCORE: 2

## 2024-01-17 ASSESSMENT — ENCOUNTER SYMPTOMS
SHORTNESS OF BREATH: 0
CHILLS: 0
FEVER: 0
PALPITATIONS: 0

## 2024-01-17 ASSESSMENT — FIBROSIS 4 INDEX: FIB4 SCORE: 0.52

## 2024-01-17 NOTE — PROGRESS NOTES
"Subjective:     CC: Medication Check    HPI:   Mr. Guido is a pleasant 33-year-old established patient of  who presents today for follow-up visit.    Patient reports that he continues to take BuSpar 15 mg daily, he has noted an improvement in his anxiety levels but reports being cranky.  He states he has an appointment scheduled with psychiatry at [facility], [place] on 1/18/2024.    Patient reports that he tried taking the sildenafil however after taking the medication he notices fogginess and does not feel well for the rest of the day.    Patient reports that he is interested in appetite stimulant for weight loss.    Patient Active Problem List    Diagnosis Date Noted    Dyslipidemia 12/07/2023    Folliculitis 11/15/2023    DANIELLE (generalized anxiety disorder) 11/15/2023    Attention deficit hyperactivity disorder (ADHD) 11/15/2023    Decreased sex drive 11/15/2023    Chronic nasal congestion 11/15/2023    Cervical radiculopathy 11/15/2023       Health Maintenance: Completed    ROS:  Review of Systems   Constitutional:  Negative for chills and fever.   Respiratory:  Negative for shortness of breath.    Cardiovascular:  Negative for chest pain and palpitations.       Objective:     Exam:  /74 (BP Location: Right arm, Patient Position: Sitting, BP Cuff Size: Adult)   Pulse 84   Temp 37.3 °C (99.1 °F) (Temporal)   Ht 1.854 m (6' 1\")   Wt (!) 138 kg (304 lb)   SpO2 96%   BMI 40.11 kg/m²  Body mass index is 40.11 kg/m².    Physical Exam  Constitutional:       Appearance: Normal appearance.   Cardiovascular:      Rate and Rhythm: Normal rate and regular rhythm.   Pulmonary:      Effort: Pulmonary effort is normal. No respiratory distress.      Breath sounds: Normal breath sounds. No stridor. No wheezing or rhonchi.   Neurological:      Mental Status: He is alert.             Assessment & Plan:     33 y.o. male with the following -     1. DANIELLE (generalized anxiety disorder)  Chronic, stable " condition. DANIELLE-7 score of 10. Patient currently taking Buspar 15 mg daily with improvement in anxiety levels but does report increase in crankiness.  Patient is scheduled to see psychiatry on 1/18/2024.  Will let further medication management be decided by psychiatry.    2. Class 2 obesity without serious comorbidity with body mass index (BMI) of 38.0 to 38.9 in adult, unspecified obesity type  Chronic, stable condition.  Patient is interested in weight loss medication.  Will [facility] patient [facility].    3. Need for vaccination  - Hep B Adult 20+          Return if symptoms worsen or fail to improve.    Please note that this dictation was created using voice recognition software. I have made every reasonable attempt to correct obvious errors, but I expect that there are errors of grammar and possibly content that I did not discover before finalizing the note.